# Patient Record
Sex: FEMALE | Race: ASIAN | NOT HISPANIC OR LATINO | URBAN - METROPOLITAN AREA
[De-identification: names, ages, dates, MRNs, and addresses within clinical notes are randomized per-mention and may not be internally consistent; named-entity substitution may affect disease eponyms.]

---

## 2021-05-27 ENCOUNTER — PREPPED CHART (OUTPATIENT)
Dept: URBAN - METROPOLITAN AREA CLINIC 21 | Facility: CLINIC | Age: 56
End: 2021-05-27

## 2021-05-27 PROBLEM — H25.812 COMBINED SENILE CATARACT: Noted: 2021-05-27

## 2021-05-27 PROBLEM — H25.813 COMBINED SENILE CATARACT: Noted: 2021-05-27

## 2022-03-31 ENCOUNTER — CATARACT CONSULTATION (OUTPATIENT)
Dept: URBAN - METROPOLITAN AREA CLINIC 54 | Facility: CLINIC | Age: 57
End: 2022-03-31

## 2022-03-31 DIAGNOSIS — H25.813: ICD-10-CM

## 2022-03-31 PROCEDURE — 92020 GONIOSCOPY: CPT

## 2022-03-31 PROCEDURE — 92136 OPHTHALMIC BIOMETRY: CPT

## 2022-03-31 PROCEDURE — 92014 COMPRE OPH EXAM EST PT 1/>: CPT

## 2022-03-31 ASSESSMENT — TONOMETRY
OS_IOP_MMHG: 17
OD_IOP_MMHG: 15

## 2022-03-31 ASSESSMENT — VISUAL ACUITY
OS_SC: 20/25
OD_SC: 20/25

## 2022-05-23 ENCOUNTER — SURGERY/PROCEDURE (OUTPATIENT)
Dept: URBAN - METROPOLITAN AREA SURGICAL CENTER 32 | Facility: SURGICAL CENTER | Age: 57
End: 2022-05-23

## 2022-05-23 DIAGNOSIS — H25.813: ICD-10-CM

## 2022-05-23 PROCEDURE — 66984 XCAPSL CTRC RMVL W/O ECP: CPT

## 2022-05-23 PROCEDURE — V2787 ASTIGMATISM-CORRECT FUNCTION: HCPCS

## 2022-05-23 PROCEDURE — V2788 PRESBYOPIA-CORRECT FUNCTION: HCPCS

## 2022-05-24 ENCOUNTER — 1 DAY POST-OP (OUTPATIENT)
Dept: URBAN - METROPOLITAN AREA CLINIC 21 | Facility: CLINIC | Age: 57
End: 2022-05-24

## 2022-05-24 DIAGNOSIS — Z96.1: ICD-10-CM

## 2022-05-24 PROCEDURE — 99024 POSTOP FOLLOW-UP VISIT: CPT

## 2022-05-24 ASSESSMENT — VISUAL ACUITY
OU_SC: J5
OD_SC: 20/25

## 2022-05-24 ASSESSMENT — TONOMETRY: OD_IOP_MMHG: 17

## 2022-06-01 ENCOUNTER — 1 WEEK POST-OP (OUTPATIENT)
Dept: URBAN - METROPOLITAN AREA CLINIC 110 | Facility: CLINIC | Age: 57
End: 2022-06-01

## 2022-06-01 DIAGNOSIS — Z96.1: ICD-10-CM

## 2022-06-01 PROCEDURE — 99024 POSTOP FOLLOW-UP VISIT: CPT

## 2022-06-01 ASSESSMENT — VISUAL ACUITY
OD_SC: 20/25-3
OD_SC: J3

## 2022-06-01 ASSESSMENT — TONOMETRY: OD_IOP_MMHG: 11

## 2024-04-05 ENCOUNTER — APPOINTMENT (OUTPATIENT)
Dept: UROLOGY | Facility: CLINIC | Age: 59
End: 2024-04-05
Payer: COMMERCIAL

## 2024-04-05 ENCOUNTER — TRANSCRIPTION ENCOUNTER (OUTPATIENT)
Age: 59
End: 2024-04-05

## 2024-04-05 VITALS
SYSTOLIC BLOOD PRESSURE: 142 MMHG | BODY MASS INDEX: 23.39 KG/M2 | WEIGHT: 132 LBS | HEART RATE: 90 BPM | DIASTOLIC BLOOD PRESSURE: 74 MMHG | OXYGEN SATURATION: 98 % | TEMPERATURE: 97.7 F | HEIGHT: 63 IN

## 2024-04-05 DIAGNOSIS — N28.1 CYST OF KIDNEY, ACQUIRED: ICD-10-CM

## 2024-04-05 PROBLEM — Z00.00 ENCOUNTER FOR PREVENTIVE HEALTH EXAMINATION: Status: ACTIVE | Noted: 2024-04-05

## 2024-04-05 LAB
BILIRUB UR QL STRIP: NEGATIVE
CLARITY UR: CLEAR
COLLECTION METHOD: NORMAL
GLUCOSE UR-MCNC: NEGATIVE
HCG UR QL: 0.2 EU/DL
HGB UR QL STRIP.AUTO: NEGATIVE
KETONES UR-MCNC: NEGATIVE
LEUKOCYTE ESTERASE UR QL STRIP: NEGATIVE
NITRITE UR QL STRIP: NEGATIVE
PH UR STRIP: 7
PROT UR STRIP-MCNC: NEGATIVE
SP GR UR STRIP: 1.02

## 2024-04-05 PROCEDURE — 81003 URINALYSIS AUTO W/O SCOPE: CPT | Mod: QW

## 2024-04-05 PROCEDURE — 76775 US EXAM ABDO BACK WALL LIM: CPT

## 2024-04-05 PROCEDURE — 99204 OFFICE O/P NEW MOD 45 MIN: CPT | Mod: 25

## 2024-04-05 NOTE — HISTORY OF PRESENT ILLNESS
[FreeTextEntry1] : 59 YO F seen TODAY 4/5/2024 as NPT For a cyst on her left kidney. She told me that she noted increased feelings similar to hot flashes about 3-6 months ago. She completed menopause 8 years ago. She also noted some dull discomfort in the left flank. She denies any gross hematuria, dysuria of UTI history. This prompted an abdominal US where cysts were found in the left kidney. That test prompted an MRI of the abdomen 2/7/2024 which confirmed the absence of hydronephrosis or renal mass, small cortical cyst in the left mid kidney (9mm) and a partially enhancing cyst (1.0cm) left mid kidney with no suspicious enhancement. She has had no interval symptoms. UA negative No prior  surgery, patient denies any medication usage, NKMA. UA negative RENAL US: Right kidney: 9.4 cm x 4.6 cm x 3.8 cm Cortical Thickness: 1.2 cm UP 1.3 cm LP Left kidney: 9.8 cm x 5.2 cm x 4.7 cm Cortical Thickness: 1.5 cm UP 1.1 cm LP Echogenicity: Normal Bladder: Not scanned Impressions: Right Kidney-The right kidney is unremarkable. Left Kidney-There is an avascular cyst with calcification in the left mid to lower pole measuring 0.7 cm. There is a simple cyst in the left mid to lower pole measuring 0.6 cm x 0.7 cm. Both kidneys are normal in size and echogenicity without hydronephrosis, stones or solid masses present.

## 2024-04-05 NOTE — ASSESSMENT
[FreeTextEntry1] : I reviewed the results of the abdominal MRI from February and the repeat renal ultrasound performed today with the patient.  These confirm stable small simple cysts in the left kidney and no other findings in the kidneys.  No intervention is indicated at this time.  I recommended that the patient return in 6 months for a repeat renal ultrasound and advised her to contact my office sooner if she develops any symptoms or hematuria. Preeti Chapin MD

## 2024-04-05 NOTE — PHYSICAL EXAM
[Normal Appearance] : normal appearance [Well Groomed] : well groomed [General Appearance - In No Acute Distress] : no acute distress [Edema] : no peripheral edema [] : no respiratory distress [Abdomen Soft] : soft [Abdomen Tenderness] : non-tender [Abdomen Mass (___ Cm)] : no abdominal mass palpated [Costovertebral Angle Tenderness] : no ~M costovertebral angle tenderness [Oriented To Time, Place, And Person] : oriented to person, place, and time [Affect] : the affect was normal [Mood] : the mood was normal [Not Anxious] : not anxious

## 2024-10-03 ENCOUNTER — APPOINTMENT (OUTPATIENT)
Dept: UROLOGY | Facility: CLINIC | Age: 59
End: 2024-10-03

## 2025-06-05 ENCOUNTER — ESTABLISHED COMPREHENSIVE EXAM (OUTPATIENT)
Dept: URBAN - METROPOLITAN AREA CLINIC 54 | Facility: CLINIC | Age: 60
End: 2025-06-05

## 2025-06-05 DIAGNOSIS — H25.812: ICD-10-CM

## 2025-06-05 DIAGNOSIS — H35.373: ICD-10-CM

## 2025-06-05 PROCEDURE — 92134 CPTRZ OPH DX IMG PST SGM RTA: CPT

## 2025-06-05 PROCEDURE — 92014 COMPRE OPH EXAM EST PT 1/>: CPT

## 2025-06-05 PROCEDURE — 92136 OPHTHALMIC BIOMETRY: CPT

## 2025-06-05 PROCEDURE — 92020 GONIOSCOPY: CPT

## 2025-06-05 ASSESSMENT — TONOMETRY
OS_IOP_MMHG: 14
OD_IOP_MMHG: 14

## 2025-06-05 ASSESSMENT — VISUAL ACUITY
OS_CC: 20/40-2
OD_CC: 20/20

## 2025-06-26 ENCOUNTER — BRIEF - ESTABLISHED (OUTPATIENT)
Dept: URBAN - METROPOLITAN AREA CLINIC 54 | Facility: CLINIC | Age: 60
End: 2025-06-26

## 2025-06-26 DIAGNOSIS — H35.373: ICD-10-CM

## 2025-06-26 DIAGNOSIS — H25.812: ICD-10-CM

## 2025-06-26 PROCEDURE — 92012 INTRM OPH EXAM EST PATIENT: CPT

## 2025-06-26 ASSESSMENT — TONOMETRY
OD_IOP_MMHG: 12
OS_IOP_MMHG: 11

## 2025-06-26 ASSESSMENT — VISUAL ACUITY: OD_SC: 20/20-3

## 2025-08-14 ENCOUNTER — 1 MONTH POST-OP (OUTPATIENT)
Dept: URBAN - METROPOLITAN AREA CLINIC 54 | Facility: CLINIC | Age: 60
End: 2025-08-14

## 2025-08-14 DIAGNOSIS — Z96.1: ICD-10-CM

## 2025-08-14 PROCEDURE — 99024 POSTOP FOLLOW-UP VISIT: CPT

## 2025-08-14 ASSESSMENT — TONOMETRY
OD_IOP_MMHG: 10
OS_IOP_MMHG: 10

## 2025-08-14 ASSESSMENT — VISUAL ACUITY
OD_SC: 20/25+2
OS_SC: 20/20-1